# Patient Record
Sex: MALE
[De-identification: names, ages, dates, MRNs, and addresses within clinical notes are randomized per-mention and may not be internally consistent; named-entity substitution may affect disease eponyms.]

---

## 2017-03-21 PROBLEM — Z00.00 ENCOUNTER FOR PREVENTIVE HEALTH EXAMINATION: Status: ACTIVE | Noted: 2017-03-21

## 2017-03-28 ENCOUNTER — APPOINTMENT (OUTPATIENT)
Dept: PSYCHIATRY | Facility: CLINIC | Age: 62
End: 2017-03-28

## 2017-03-28 DIAGNOSIS — M10.9 GOUT, UNSPECIFIED: ICD-10-CM

## 2017-03-28 DIAGNOSIS — E78.5 HYPERLIPIDEMIA, UNSPECIFIED: ICD-10-CM

## 2017-03-28 RX ORDER — ASPIRIN ENTERIC COATED TABLETS 81 MG 81 MG/1
81 TABLET, DELAYED RELEASE ORAL
Refills: 0 | Status: ACTIVE | COMMUNITY
Start: 2017-03-28

## 2017-03-28 RX ORDER — ENALAPRIL MALEATE 20 MG/1
20 TABLET ORAL
Qty: 180 | Refills: 0 | Status: ACTIVE | COMMUNITY
Start: 2016-12-07

## 2017-03-28 RX ORDER — PEN NEEDLE, DIABETIC 29 G X1/2"
31G X 8 MM NEEDLE, DISPOSABLE MISCELLANEOUS
Qty: 100 | Refills: 0 | Status: ACTIVE | COMMUNITY
Start: 2016-11-22

## 2017-03-28 RX ORDER — GLIMEPIRIDE 4 MG/1
4 TABLET ORAL
Qty: 90 | Refills: 0 | Status: ACTIVE | COMMUNITY
Start: 2016-11-16

## 2017-03-28 RX ORDER — ROSUVASTATIN CALCIUM 20 MG/1
20 TABLET, FILM COATED ORAL
Qty: 90 | Refills: 0 | Status: ACTIVE | COMMUNITY
Start: 2016-11-22

## 2017-03-28 RX ORDER — ALLOPURINOL 300 MG/1
300 TABLET ORAL
Qty: 135 | Refills: 0 | Status: ACTIVE | COMMUNITY
Start: 2016-10-05

## 2017-04-06 ENCOUNTER — APPOINTMENT (OUTPATIENT)
Dept: PSYCHIATRY | Facility: CLINIC | Age: 62
End: 2017-04-06

## 2017-04-06 DIAGNOSIS — F43.23 ADJUSTMENT DISORDER WITH MIXED ANXIETY AND DEPRESSED MOOD: ICD-10-CM

## 2017-04-20 ENCOUNTER — APPOINTMENT (OUTPATIENT)
Dept: PSYCHIATRY | Facility: CLINIC | Age: 62
End: 2017-04-20

## 2017-04-25 ENCOUNTER — APPOINTMENT (OUTPATIENT)
Dept: PSYCHIATRY | Facility: CLINIC | Age: 62
End: 2017-04-25

## 2017-04-27 ENCOUNTER — APPOINTMENT (OUTPATIENT)
Dept: PSYCHIATRY | Facility: CLINIC | Age: 62
End: 2017-04-27

## 2020-01-16 ENCOUNTER — APPOINTMENT (OUTPATIENT)
Dept: CARDIOLOGY | Facility: CLINIC | Age: 65
End: 2020-01-16
Payer: COMMERCIAL

## 2020-01-16 ENCOUNTER — NON-APPOINTMENT (OUTPATIENT)
Age: 65
End: 2020-01-16

## 2020-01-16 VITALS
OXYGEN SATURATION: 97 % | SYSTOLIC BLOOD PRESSURE: 126 MMHG | HEART RATE: 106 BPM | WEIGHT: 242 LBS | DIASTOLIC BLOOD PRESSURE: 84 MMHG | HEIGHT: 70.75 IN | BODY MASS INDEX: 33.88 KG/M2

## 2020-01-16 DIAGNOSIS — R00.0 TACHYCARDIA, UNSPECIFIED: ICD-10-CM

## 2020-01-16 DIAGNOSIS — R06.09 OTHER FORMS OF DYSPNEA: ICD-10-CM

## 2020-01-16 DIAGNOSIS — R06.00 DYSPNEA, UNSPECIFIED: ICD-10-CM

## 2020-01-16 DIAGNOSIS — Z82.49 FAMILY HISTORY OF ISCHEMIC HEART DISEASE AND OTHER DISEASES OF THE CIRCULATORY SYSTEM: ICD-10-CM

## 2020-01-16 DIAGNOSIS — I10 ESSENTIAL (PRIMARY) HYPERTENSION: ICD-10-CM

## 2020-01-16 PROCEDURE — 93000 ELECTROCARDIOGRAM COMPLETE: CPT

## 2020-01-16 PROCEDURE — 99204 OFFICE O/P NEW MOD 45 MIN: CPT

## 2020-01-16 RX ORDER — PEN NEEDLE, DIABETIC 32GX 5/32"
31G X 6 MM NEEDLE, DISPOSABLE MISCELLANEOUS
Qty: 100 | Refills: 0 | Status: ACTIVE | COMMUNITY
Start: 2019-05-06

## 2020-01-16 RX ORDER — METFORMIN HYDROCHLORIDE 1000 MG/1
1000 TABLET, COATED ORAL
Qty: 60 | Refills: 0 | Status: ACTIVE | COMMUNITY
Start: 2019-08-19

## 2020-01-16 NOTE — DISCUSSION/SUMMARY
[FreeTextEntry1] : Herman is a 64 year old male here for evaluation.\par \par He has a history of hypertension, diabetes, and hyperlipidemia, and is concerned about coronary artery disease. His EKG demonstrates a sinus tachycardia, with a left axis deviation, though no obvious ischemia. His blood pressure is near goal. His physical exam is unremarkable.\par \par His heart rate may be on the higher side, in the setting of diarrhea and a GI virus. I recommended that he increase his fluid intake. \par In the setting of his tachycardia and cardiac risk, he will have an echocardiogram to evaluate for structural heart disease, and an exercise nuclear stress test to evaluate for ischemia, especially in the setting of his diabetes. I have requested a copy of his most recent blood work, including lipid panel. I stressed the importance of diet, exercise, and weight loss, to reduce his overall cardiovascular risk. He will remain on enalapril at current dose for his blood pressure, and Crestor for hyperlipidemia. I will call with results the above tests, and arrange followup.

## 2020-01-16 NOTE — HISTORY OF PRESENT ILLNESS
[FreeTextEntry1] : Herman is a 64 year old male here for evaluation.\par \par He has a history of hypertension, diabetes, and hyperlipidemia. 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 He denies a significant decrease in exercise tolerance, though has minimal dyspnea. He runs around with his 6 year old daughter, and generally feels well. He has no chest pain, palpitations, lower extremity swelling, orthopnea, PND, dizziness, lightheadedness, or near syncope.\par \par Neither of his parents have coronary artery disease, though he has an uncle with stents. He denies toxic habits. His diet is not perfect, and has an elevated cholesterol.\par He is just getting over a GI illness with vomiting and diarrhea.

## 2020-01-16 NOTE — PHYSICAL EXAM
[Well Groomed] : well groomed [General Appearance - Well Developed] : well developed [Normal Appearance] : normal appearance [General Appearance - Well Nourished] : well nourished [No Deformities] : no deformities [General Appearance - In No Acute Distress] : no acute distress [Eyelids - No Xanthelasma] : the eyelids demonstrated no xanthelasmas [Normal Conjunctiva] : the conjunctiva exhibited no abnormalities [No Oral Cyanosis] : no oral cyanosis [Normal Oral Mucosa] : normal oral mucosa [No Oral Pallor] : no oral pallor [Normal Jugular Venous V Waves Present] : normal jugular venous V waves present [Normal Jugular Venous A Waves Present] : normal jugular venous A waves present [Normal Rate] : normal [No Jugular Venous [place] A Waves] : no jugular venous [place] A waves [Normal S1] : normal S1 [Normal S2] : normal S2 [S3] : no S3 [S4] : no S4 [No Murmur] : no murmurs heard [Right Carotid Bruit] : no bruit heard over the right carotid [Left Carotid Bruit] : no bruit heard over the left carotid [Left Femoral Bruit] : no bruit heard over the left femoral artery [Right Femoral Bruit] : no bruit heard over the right femoral artery [2+] : left 2+ [No Abnormalities] : the abdominal aorta was not enlarged and no bruit was heard [No Pitting Edema] : no pitting edema present [Respiration, Rhythm And Depth] : normal respiratory rhythm and effort [Auscultation Breath Sounds / Voice Sounds] : lungs were clear to auscultation bilaterally [Abdomen Soft] : soft [Exaggerated Use Of Accessory Muscles For Inspiration] : no accessory muscle use [Abdomen Tenderness] : non-tender [Abnormal Walk] : normal gait [Abdomen Mass (___ Cm)] : no abdominal mass palpated [Gait - Sufficient For Exercise Testing] : the gait was sufficient for exercise testing [Cyanosis, Localized] : no localized cyanosis [Nail Clubbing] : no clubbing of the fingernails [Petechial Hemorrhages (___cm)] : no petechial hemorrhages [Skin Color & Pigmentation] : normal skin color and pigmentation [No Venous Stasis] : no venous stasis [] : no rash [No Skin Ulcers] : no skin ulcer [Skin Lesions] : no skin lesions [No Xanthoma] : no  xanthoma was observed [Affect] : the affect was normal [Oriented To Time, Place, And Person] : oriented to person, place, and time [Mood] : the mood was normal [No Anxiety] : not feeling anxious

## 2020-01-27 ENCOUNTER — APPOINTMENT (OUTPATIENT)
Dept: CARDIOLOGY | Facility: CLINIC | Age: 65
End: 2020-01-27
Payer: COMMERCIAL

## 2020-01-27 PROCEDURE — 93306 TTE W/DOPPLER COMPLETE: CPT

## 2020-02-03 ENCOUNTER — APPOINTMENT (OUTPATIENT)
Dept: CARDIOLOGY | Facility: CLINIC | Age: 65
End: 2020-02-03

## 2020-03-20 ENCOUNTER — APPOINTMENT (OUTPATIENT)
Dept: CARDIOLOGY | Facility: CLINIC | Age: 65
End: 2020-03-20

## 2020-06-17 ENCOUNTER — APPOINTMENT (OUTPATIENT)
Dept: ENDOCRINOLOGY | Facility: CLINIC | Age: 65
End: 2020-06-17
Payer: COMMERCIAL

## 2020-06-17 DIAGNOSIS — Z83.3 FAMILY HISTORY OF DIABETES MELLITUS: ICD-10-CM

## 2020-06-17 PROCEDURE — 99205 OFFICE O/P NEW HI 60 MIN: CPT | Mod: 95

## 2020-06-17 RX ORDER — METFORMIN HYDROCHLORIDE 1000 MG/1
1000 TABLET, EXTENDED RELEASE ORAL
Qty: 60 | Refills: 0 | Status: DISCONTINUED | COMMUNITY
Start: 2016-11-08 | End: 2020-06-17

## 2020-06-17 RX ORDER — FLASH GLUCOSE SCANNING READER
EACH MISCELLANEOUS
Qty: 1 | Refills: 0 | Status: ACTIVE | COMMUNITY
Start: 2020-06-17 | End: 1900-01-01

## 2020-06-17 RX ORDER — INSULIN DETEMIR 100 [IU]/ML
100 INJECTION, SOLUTION SUBCUTANEOUS
Qty: 15 | Refills: 0 | Status: DISCONTINUED | COMMUNITY
Start: 2016-10-29 | End: 2020-06-17

## 2020-06-17 RX ORDER — INSULIN LISPRO 200 [IU]/ML
200 INJECTION, SOLUTION SUBCUTANEOUS
Qty: 6 | Refills: 0 | Status: DISCONTINUED | COMMUNITY
Start: 2017-01-11 | End: 2020-06-17

## 2020-06-17 RX ORDER — INSULIN ASPART 100 [IU]/ML
100 INJECTION, SOLUTION INTRAVENOUS; SUBCUTANEOUS
Qty: 30 | Refills: 0 | Status: DISCONTINUED | COMMUNITY
Start: 2019-07-17 | End: 2020-06-17

## 2020-06-17 NOTE — CONSULT LETTER
[Consult Letter:] : I had the pleasure of evaluating your patient, [unfilled]. [Dear  ___] : Dear  [unfilled], [Please see my note below.] : Please see my note below. [Sincerely,] : Sincerely, [Consult Closing:] : Thank you very much for allowing me to participate in the care of this patient.  If you have any questions, please do not hesitate to contact me. [FreeTextEntry2] : Gutierrez Roque [FreeTextEntry3] : Yovana Villarreal MS. DO.\par Endocrinology, Diabetes and Metabolism\par [address] [place] [address]\[place] [address]\par Tel [phone]\par Fax [phone]\par

## 2020-06-17 NOTE — REASON FOR VISIT
[Initial Evaluation] : an initial evaluation [DM Type 2] : DM Type 2 [FreeTextEntry2] : Dr. Gutierrez Roque

## 2020-06-17 NOTE — PHYSICAL EXAM
[Alert] : alert [Well Nourished] : well nourished [Obese] : obese [Healthy Appearance] : healthy appearance [No Acute Distress] : no acute distress [Normal Voice/Communication] : normal voice communication [Well Developed] : well developed

## 2020-06-17 NOTE — HISTORY OF PRESENT ILLNESS
[Medical Office: ([facility])___] : at the medical office located in  [Home] : at home, [unfilled] , at the time of the visit. [Verbal consent obtained from patient] : the patient, [unfilled] [FreeTextEntry1] : Mr. DAMIR RECINOS  is a 65 year old male with past medical history of Diabetes Mellitus Type 2,  who presents for management of his diabetes. He  has had difficulty in controlling his  blood sugars. He used to be in worse control but was able to lose some weight and bring down the blood sugars however, they are getting high again. Patient denies any history of diabetic retinopathy, nephropathy or neuropathy. He denies any blurry vision and numbness/tingling in the extremities but admits to polyuria, polydipsia \par \par \par Diabetes first diagnosed:10-12 years\par Type: 2\par \par Current diabetic regimen:\par Metformin 1000 mg BID\par Glimepiride 4 mg QD\par Levemir 26 units QHS\par Novolog PRN\par Victoza 1.2 mg QD\par \par Other relevant medications:\par \par Self monitoring blood glucose : 1x times per day:\par \par SMBG:\par Pre-breakfast:220-340\par bedtime:310-320\par \par Hypoglycemia:no\par \par \par Diet:\par Breakfast: hard boiled eggs and rye toast\par Lunch:grilled chicken, veggies\par Dinner:meatloaf, broccoli\par Snacks: water, soda, ice cream, candy\par \par Exercise:\par \par Urine micro:elevated (9/2019)\par lipid profile: LDL58 (9/2019)\par last hba1c:13.1% (9/2019)\par eye exam: 2019\par diabetic foot exam/podiatry:NA\par \par \par \par \par

## 2020-06-17 NOTE — ASSESSMENT
[FreeTextEntry1] : 1. DM 2- uncontrolled, uncomplicated\par Patient counseled on the importance of diabetic control and complications. Patient's diet and the necessary changes discussed. Had extensive review over carbohydrate counting and limits per meals. Limiting refined sugar. Lifestyle changes. Reviewed over freestyle howard and use. Reviewed over glycemic goals. Discussed other options for diabetes control. Will try to switch to Tresiba due to better absorption and he needs more basal. Patient will work on his diet\par \par Cont Metformin 1000 mg BID\par Cont Glimepiride 4 mg QD\par Stop levemir\par Start Tresiba 30 units QHS\par Cont Victoza 1.2 mg QD\par Check fsg QID\par Cont diabetic diet\par Cont exercise\par will check labs\par Opthalmology Visit needs follow up\par Foot Exam-deferred due to telehealth\par \par 2. HLD- stable\par Well controlled on Rosuvastatin\par \par Follow up in 2 weeks\par

## 2020-06-18 ENCOUNTER — TRANSCRIPTION ENCOUNTER (OUTPATIENT)
Age: 65
End: 2020-06-18

## 2020-07-01 ENCOUNTER — APPOINTMENT (OUTPATIENT)
Dept: ENDOCRINOLOGY | Facility: CLINIC | Age: 65
End: 2020-07-01

## 2020-07-01 NOTE — HISTORY OF PRESENT ILLNESS
[FreeTextEntry1] : Mr. DAMIR RECINOS  is a 65 year old male with past medical history of Diabetes Mellitus Type 2,  who presents for management of his diabetes. He  has had difficulty in controlling his  blood sugars. He used to be in worse control but was able to lose some weight and bring down the blood sugars however, they are getting high again. Patient denies any history of diabetic retinopathy, nephropathy or neuropathy. He denies any blurry vision and numbness/tingling in the extremities but admits to polyuria, polydipsia \par \par \par Diabetes first diagnosed:10-12 years\par Type: 2\par \par Current diabetic regimen:\par Metformin 1000 mg BID\par Glimepiride 4 mg QD\par Levemir 26 units QHS\par Novolog PRN\par Victoza 1.2 mg QD\par \par Other relevant medications:\par \par Self monitoring blood glucose : 1x times per day:\par \par SMBG:\par Pre-breakfast:220-340\par bedtime:310-320\par \par Hypoglycemia:no\par \par \par Diet:\par Breakfast: hard boiled eggs and rye toast\par Lunch:grilled chicken, veggies\par Dinner:meatloaf, broccoli\par Snacks: water, soda, ice cream, candy\par \par Exercise:\par \par Urine micro:elevated (9/2019)\par lipid profile: LDL58 (9/2019)\par last hba1c:13.1% (9/2019)\par eye exam: 2019\par diabetic foot exam/podiatry:NA\par \par \par \par \par

## 2020-07-08 ENCOUNTER — APPOINTMENT (OUTPATIENT)
Dept: CARDIOLOGY | Facility: CLINIC | Age: 65
End: 2020-07-08
Payer: COMMERCIAL

## 2020-07-08 ENCOUNTER — APPOINTMENT (OUTPATIENT)
Dept: ENDOCRINOLOGY | Facility: CLINIC | Age: 65
End: 2020-07-08
Payer: COMMERCIAL

## 2020-07-08 PROCEDURE — 99214 OFFICE O/P EST MOD 30 MIN: CPT | Mod: 95

## 2020-07-08 PROCEDURE — A9500: CPT

## 2020-07-08 PROCEDURE — 78452 HT MUSCLE IMAGE SPECT MULT: CPT

## 2020-07-08 PROCEDURE — 93015 CV STRESS TEST SUPVJ I&R: CPT

## 2020-07-08 NOTE — HISTORY OF PRESENT ILLNESS
[Home] : at home, [unfilled] , at the time of the visit. [Medical Office: ([facility])___] : at the medical office located in  [Verbal consent obtained from patient] : the patient, [unfilled] [FreeTextEntry1] : Mr. DAMIR RECINOS  is a 65 year old male with past medical history of Diabetes Mellitus Type 2,  who presents for management of his diabetes.\par \par Interval Hx: Patient started using the howard. Still having hyperglycemia. Switched to Tresiba\par \par Initial History(6/17/20):  He  has had difficulty in controlling his  blood sugars. He used to be in worse control but was able to lose some weight and bring down the blood sugars however, they are getting high again. Patient denies any history of diabetic retinopathy, nephropathy or neuropathy. He denies any blurry vision and numbness/tingling in the extremities but admits to polyuria, polydipsia \par \par \par Diabetes first diagnosed:10-12 years\par Type: 2\par \par Current diabetic regimen:\par Metformin 1000 mg BID\par Glimepiride 4 mg QD\par Tresiba 30 units QHS\par Novolog PRN\par Victoza 1.2 mg QD\par \par Other relevant medications:\par \par Self monitoring blood glucose : 3-4x times per day:\par \par SMBG:\par Pre-breakfast:200s\par bedtime:200-300s\par \par Hypoglycemia:no\par \par \par Diet:\par Breakfast: hard boiled eggs and rye toast\par Lunch:grilled chicken, veggies\par Dinner:meatloaf, broccoli\par Snacks: water, soda, ice cream, candy\par \par Exercise:\par \par Urine micro:elevated (9/2019)\par lipid profile: LDL58 (9/2019)\par last hba1c:13.1% (9/2019)\par eye exam: 2019\par diabetic foot exam/podiatry:NA\par \par \par \par \par

## 2020-07-08 NOTE — PHYSICAL EXAM
[Alert] : alert [Well Nourished] : well nourished [Healthy Appearance] : healthy appearance [Obese] : obese [Well Developed] : well developed [No Acute Distress] : no acute distress [Oriented x3] : oriented to person, place, and time [Normal Voice/Communication] : normal voice communication

## 2020-07-08 NOTE — ASSESSMENT
[FreeTextEntry1] : 1. DM 2- uncontrolled, uncomplicated\par Patient counseled on the importance of diabetic control and complications. Patient's diet and the necessary changes discussed. Had extensive review over carbohydrate counting and limits per meals. Limiting refined sugar. Lifestyle changes. Reviewed over freestyle howard and use. Reviewed over glycemic goals.\par \par Cont Metformin 1000 mg BID\par Cont Glimepiride 4 mg QD\par Inc to Tresiba 35 units QHS\par Inc to Victoza 1.8 mg QD\par Check fsg QID\par Cont diabetic diet\par Cont exercise\par will check labs\par Opthalmology Visit needs follow up\par Foot Exam-deferred due to telehealth\par \par 2. HLD- stable\par Well controlled on Rosuvastatin\par \par Follow up in 4 weeks\par

## 2020-12-30 ENCOUNTER — RX RENEWAL (OUTPATIENT)
Age: 65
End: 2020-12-30

## 2021-02-04 ENCOUNTER — RX RENEWAL (OUTPATIENT)
Age: 66
End: 2021-02-04

## 2021-02-10 ENCOUNTER — RX RENEWAL (OUTPATIENT)
Age: 66
End: 2021-02-10

## 2021-03-30 ENCOUNTER — APPOINTMENT (OUTPATIENT)
Dept: ENDOCRINOLOGY | Facility: CLINIC | Age: 66
End: 2021-03-30

## 2021-05-10 ENCOUNTER — RX RENEWAL (OUTPATIENT)
Age: 66
End: 2021-05-10

## 2021-05-11 ENCOUNTER — RX RENEWAL (OUTPATIENT)
Age: 66
End: 2021-05-11

## 2021-05-20 ENCOUNTER — RX RENEWAL (OUTPATIENT)
Age: 66
End: 2021-05-20

## 2021-05-20 RX ORDER — LIRAGLUTIDE 6 MG/ML
18 INJECTION SUBCUTANEOUS
Qty: 27 | Refills: 0 | Status: ACTIVE | COMMUNITY
Start: 2016-11-21 | End: 1900-01-01

## 2021-06-15 ENCOUNTER — APPOINTMENT (OUTPATIENT)
Dept: ENDOCRINOLOGY | Facility: CLINIC | Age: 66
End: 2021-06-15

## 2021-08-04 ENCOUNTER — RX RENEWAL (OUTPATIENT)
Age: 66
End: 2021-08-04

## 2021-08-19 ENCOUNTER — RX RENEWAL (OUTPATIENT)
Age: 66
End: 2021-08-19

## 2021-08-28 ENCOUNTER — RX RENEWAL (OUTPATIENT)
Age: 66
End: 2021-08-28

## 2021-09-02 ENCOUNTER — APPOINTMENT (OUTPATIENT)
Dept: ENDOCRINOLOGY | Facility: CLINIC | Age: 66
End: 2021-09-02
Payer: COMMERCIAL

## 2021-09-02 ENCOUNTER — RX RENEWAL (OUTPATIENT)
Age: 66
End: 2021-09-02

## 2021-09-02 VITALS
HEIGHT: 70 IN | HEART RATE: 86 BPM | SYSTOLIC BLOOD PRESSURE: 147 MMHG | WEIGHT: 248 LBS | BODY MASS INDEX: 35.5 KG/M2 | OXYGEN SATURATION: 97 % | DIASTOLIC BLOOD PRESSURE: 97 MMHG

## 2021-09-02 DIAGNOSIS — E78.5 HYPERLIPIDEMIA, UNSPECIFIED: ICD-10-CM

## 2021-09-02 LAB — GLUCOSE BLDC GLUCOMTR-MCNC: 237

## 2021-09-02 PROCEDURE — 99215 OFFICE O/P EST HI 40 MIN: CPT | Mod: 25

## 2021-09-02 PROCEDURE — 82962 GLUCOSE BLOOD TEST: CPT

## 2021-09-02 RX ORDER — FLASH GLUCOSE SENSOR
KIT MISCELLANEOUS
Qty: 2 | Refills: 2 | Status: ACTIVE | COMMUNITY
Start: 2020-06-17 | End: 1900-01-01

## 2021-09-02 NOTE — ASSESSMENT
[FreeTextEntry1] : 1. DM 2- uncontrolled, uncomplicated\par Patient counseled on the importance of diabetic control and complications. Patient's diet and the necessary changes discussed. Had extensive review over carbohydrate counting and limits per meals. Limiting refined sugar. Lifestyle changes. Reviewed over freestyle howard and use. Reviewed over glycemic goals.\par \par Cont Metformin 1000 mg BID\par Cont Glimepiride 4 mg QD\par Cont Tresiba 36 units QHS\par Stop Victoza\par Start ozempic\par Novolog scale:\par , 8 units\par 120-180, 10 units\par 181-230, 12 units\par etc\par Check fsg QID\par Cont diabetic diet\par Cont exercise\par will check labs\par Opthalmology Visit needs follow up\par Foot Exam UTD\par \par 2. HLD- stable\par Well controlled on Rosuvastatin\par \par \par

## 2021-09-02 NOTE — PHYSICAL EXAM
[Alert] : alert [Well Nourished] : well nourished [No Acute Distress] : no acute distress [Well Developed] : well developed [Normal Sclera/Conjunctiva] : normal sclera/conjunctiva [EOMI] : extra ocular movement intact [No Proptosis] : no proptosis [Normal Oropharynx] : the oropharynx was normal [No Thyroid Nodules] : no palpable thyroid nodules [Thyroid Not Enlarged] : the thyroid was not enlarged [No Respiratory Distress] : no respiratory distress [No Accessory Muscle Use] : no accessory muscle use [Clear to Auscultation] : lungs were clear to auscultation bilaterally [Normal S1, S2] : normal S1 and S2 [Normal Rate] : heart rate was normal [Regular Rhythm] : with a regular rhythm [No Edema] : no peripheral edema [Pedal Pulses Normal] : the pedal pulses are present [Normal Bowel Sounds] : normal bowel sounds [Not Tender] : non-tender [Not Distended] : not distended [Soft] : abdomen soft [Normal Anterior Cervical Nodes] : no anterior cervical lymphadenopathy [Normal Posterior Cervical Nodes] : no posterior cervical lymphadenopathy [No Spinal Tenderness] : no spinal tenderness [Spine Straight] : spine straight [No Stigmata of Cushings Syndrome] : no stigmata of Cushings Syndrome [Normal Gait] : normal gait [Normal Strength/Tone] : muscle strength and tone were normal [No Rash] : no rash [Normal Reflexes] : deep tendon reflexes were 2+ and symmetric [No Tremors] : no tremors [Oriented x3] : oriented to person, place, and time [Acanthosis Nigricans] : no acanthosis nigricans [Normal] : normal [Full ROM] : with full range of motion [Diminished Throughout Both Feet] : normal tactile sensation with monofilament testing throughout both feet [#1 Diminished] : number 1 was diminished

## 2021-09-02 NOTE — HISTORY OF PRESENT ILLNESS
[FreeTextEntry1] : Mr. DAMIR RECINOS  is a 66 year old male with past medical history of Diabetes Mellitus Type 2, HLD who presents for management of his diabetes.\par \par Diabetes first diagnosed:10-12 years\par Type: 2\par \par Current diabetic regimen:\par Metformin 1000 mg BID\par Glimepiride 4 mg QD \par Tresiba 36 units QHS\par Novolog 10 QHS\par Victoza 1.2 mg QD\par \par Other relevant medications:\par \par Self monitoring blood glucose : PAtient was using freestyle howard\par \par SMBG:\par Pre-breakfast:200s\par bedtime:200-300s\par \par Hypoglycemia:no\par \par \par Diet:\par Breakfast: hard boiled eggs and rye toast\par Lunch:grilled chicken, veggies\par Dinner:meatloaf, broccoli\par Snacks: water, soda, ice cream, candy\par \par Exercise:\par \par Urine micro:elevated (9/2019)\par lipid profile: LDL58 (9/2019)\par last hba1c:13.1% (9/2019)\par eye exam: 2019\par diabetic foot exam/podiatry:NA\par \par \par \par \par

## 2021-09-15 ENCOUNTER — NON-APPOINTMENT (OUTPATIENT)
Age: 66
End: 2021-09-15

## 2021-09-15 LAB
ALBUMIN SERPL ELPH-MCNC: 4.7 G/DL
ALP BLD-CCNC: 79 U/L
ALT SERPL-CCNC: 66 U/L
ANION GAP SERPL CALC-SCNC: 17 MMOL/L
AST SERPL-CCNC: 55 U/L
BASOPHILS # BLD AUTO: 0.06 K/UL
BASOPHILS NFR BLD AUTO: 0.7 %
BILIRUB SERPL-MCNC: 1.1 MG/DL
BUN SERPL-MCNC: 21 MG/DL
CALCIUM SERPL-MCNC: 10.1 MG/DL
CHLORIDE SERPL-SCNC: 101 MMOL/L
CHOLEST SERPL-MCNC: 109 MG/DL
CO2 SERPL-SCNC: 21 MMOL/L
CREAT SERPL-MCNC: 1.1 MG/DL
CREAT SPEC-SCNC: 116 MG/DL
EOSINOPHIL # BLD AUTO: 0.12 K/UL
EOSINOPHIL NFR BLD AUTO: 1.4 %
ESTIMATED AVERAGE GLUCOSE: 235 MG/DL
FRUCTOSAMINE SERPL-MCNC: 366 UMOL/L
GLUCOSE SERPL-MCNC: 245 MG/DL
HBA1C MFR BLD HPLC: 9.8 %
HCT VFR BLD CALC: 44.9 %
HDLC SERPL-MCNC: 34 MG/DL
HGB BLD-MCNC: 14.8 G/DL
IMM GRANULOCYTES NFR BLD AUTO: 0.6 %
LDLC SERPL CALC-MCNC: 15 MG/DL
LYMPHOCYTES # BLD AUTO: 2.39 K/UL
LYMPHOCYTES NFR BLD AUTO: 27.2 %
MAN DIFF?: NORMAL
MCHC RBC-ENTMCNC: 29.1 PG
MCHC RBC-ENTMCNC: 33 GM/DL
MCV RBC AUTO: 88.4 FL
MICROALBUMIN 24H UR DL<=1MG/L-MCNC: 93.7 MG/DL
MICROALBUMIN/CREAT 24H UR-RTO: 808 MG/G
MONOCYTES # BLD AUTO: 0.54 K/UL
MONOCYTES NFR BLD AUTO: 6.1 %
NEUTROPHILS # BLD AUTO: 5.63 K/UL
NEUTROPHILS NFR BLD AUTO: 64 %
NONHDLC SERPL-MCNC: 74 MG/DL
PLATELET # BLD AUTO: 266 K/UL
POTASSIUM SERPL-SCNC: 4.8 MMOL/L
PROT SERPL-MCNC: 7.3 G/DL
RBC # BLD: 5.08 M/UL
RBC # FLD: 13.9 %
SODIUM SERPL-SCNC: 139 MMOL/L
TRIGL SERPL-MCNC: 296 MG/DL
TSH SERPL-ACNC: 3.52 UIU/ML
WBC # FLD AUTO: 8.79 K/UL

## 2021-12-02 ENCOUNTER — APPOINTMENT (OUTPATIENT)
Dept: ENDOCRINOLOGY | Facility: CLINIC | Age: 66
End: 2021-12-02

## 2021-12-05 ENCOUNTER — RX RENEWAL (OUTPATIENT)
Age: 66
End: 2021-12-05

## 2021-12-07 RX ORDER — INSULIN DEGLUDEC INJECTION 100 U/ML
100 INJECTION, SOLUTION SUBCUTANEOUS
Qty: 1 | Refills: 1 | Status: ACTIVE | COMMUNITY
Start: 2020-06-17 | End: 1900-01-01

## 2021-12-07 RX ORDER — SEMAGLUTIDE 1.34 MG/ML
2 INJECTION, SOLUTION SUBCUTANEOUS
Qty: 3 | Refills: 0 | Status: ACTIVE | COMMUNITY
Start: 2021-09-02 | End: 1900-01-01

## 2022-01-06 ENCOUNTER — APPOINTMENT (OUTPATIENT)
Dept: NEUROLOGY | Facility: CLINIC | Age: 67
End: 2022-01-06

## 2022-03-08 DIAGNOSIS — E11.65 TYPE 2 DIABETES MELLITUS WITH HYPERGLYCEMIA: ICD-10-CM

## 2022-03-09 ENCOUNTER — APPOINTMENT (OUTPATIENT)
Dept: ENDOCRINOLOGY | Facility: CLINIC | Age: 67
End: 2022-03-09